# Patient Record
Sex: FEMALE | Race: WHITE | NOT HISPANIC OR LATINO
[De-identification: names, ages, dates, MRNs, and addresses within clinical notes are randomized per-mention and may not be internally consistent; named-entity substitution may affect disease eponyms.]

---

## 2018-08-15 ENCOUNTER — APPOINTMENT (OUTPATIENT)
Dept: FAMILY MEDICINE | Facility: CLINIC | Age: 28
End: 2018-08-15
Payer: COMMERCIAL

## 2018-08-15 VITALS
RESPIRATION RATE: 12 BRPM | OXYGEN SATURATION: 98 % | DIASTOLIC BLOOD PRESSURE: 72 MMHG | WEIGHT: 130 LBS | SYSTOLIC BLOOD PRESSURE: 118 MMHG | HEART RATE: 86 BPM | HEIGHT: 64 IN | BODY MASS INDEX: 22.2 KG/M2

## 2018-08-15 DIAGNOSIS — Z00.00 ENCOUNTER FOR GENERAL ADULT MEDICAL EXAMINATION W/OUT ABNORMAL FINDINGS: ICD-10-CM

## 2018-08-15 DIAGNOSIS — R53.83 OTHER FATIGUE: ICD-10-CM

## 2018-08-15 PROCEDURE — 99385 PREV VISIT NEW AGE 18-39: CPT

## 2018-08-15 NOTE — HISTORY OF PRESENT ILLNESS
[FreeTextEntry1] : 28-year-old white female new to this office, transferring care, here for a CPE. [de-identified] : No significant past medical or surgical history.\par Patient is a registered nurse working at [employer], ,  2 para 2, postpartum 6 months, breast-feeding, nonsmoker, social alcohol and no drugs.\par Patient complaining of fatigue, working full time and taking care of 2 children, one her toddler and the other an infant.\par She is on daily multivitamins.

## 2018-08-15 NOTE — ASSESSMENT
[FreeTextEntry1] : Physical exam normal\par Blood and urine to be collected at outside lab for complete physical profile.\par Fatigue----CBC, TSH, Lyme titer checked.\par Screening for cervical cancer--- patient is up to date with GYN appointments. Postpartum checkup was done 6 months ago.\par Patient is up to date with immunizations-\par \par Followup results one week.\par \par

## 2018-08-15 NOTE — HEALTH RISK ASSESSMENT
[Very Good] : ~his/her~  mood as very good [No falls in past year] : Patient reported no falls in the past year [0] : 2) Feeling down, depressed, or hopeless: Not at all (0) [Patient declined mammogram] : Patient declined mammogram [Patient reported PAP Smear was normal] : Patient reported PAP Smear was normal [Patient declined bone density test] : Patient declined bone density test [Patient declined colonoscopy] : Patient declined colonoscopy [HIV test declined] : HIV test declined [Hepatitis C test declined] : Hepatitis C test declined [None] : None [With Family] : lives with family [# of Members in Household ___] :  household currently consist of [unfilled] member(s) [Employed] : employed [College] : College [] :  [# Of Children ___] : has [unfilled] children [Sexually Active] : sexually active [Feels Safe at Home] : Feels safe at home [Fully functional (bathing, dressing, toileting, transferring, walking, feeding)] : Fully functional (bathing, dressing, toileting, transferring, walking, feeding) [Fully functional (using the telephone, shopping, preparing meals, housekeeping, doing laundry, using] : Fully functional and needs no help or supervision to perform IADLs (using the telephone, shopping, preparing meals, housekeeping, doing laundry, using transportation, managing medications and managing finances) [Smoke Detector] : smoke detector [Carbon Monoxide Detector] : carbon monoxide detector [Safety elements used in home] : safety elements used in home [Seat Belt] :  uses seat belt [Sunscreen] : uses sunscreen [Patient declined discussion] : Patient declined discussion [] : No [de-identified] : Derm [[id number]] : 0 [Change in mental status noted] : No change in mental status noted [Language] : denies difficulty with language [Behavior] : denies difficulty with behavior [Learning/Retaining New Information] : denies difficulty learning/retaining new information [Handling Complex Tasks] : denies difficulty handling complex tasks [Reasoning] : denies difficulty with reasoning [Spatial Ability and Orientation] : denies difficulty with spatial ability and orientation [High Risk Behavior] : no high risk behavior [Reports changes in hearing] : Reports no changes in hearing [Reports changes in vision] : Reports no changes in vision [Reports changes in dental health] : Reports no changes in dental health [Guns at Home] : no guns at home [TB Exposure] : is not being exposed to tuberculosis [Caregiver Concerns] : does not have caregiver concerns [PapSmearDate] : 07/17 [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

## 2018-08-15 NOTE — REVIEW OF SYSTEMS
[Fatigue] : fatigue [Negative] : Heme/Lymph [Fever] : no fever [Chills] : no chills [Discharge] : no discharge [Vision Problems] : no vision problems [Itching] : no itching [Earache] : no earache [Nasal Discharge] : no nasal discharge [Sore Throat] : no sore throat [Postnasal Drip] : no postnasal drip [Chest Pain] : no chest pain [Palpitations] : no palpitations [Lower Ext Edema] : no lower extremity edema [Orthopnea] : no orthopnea [Shortness Of Breath] : no shortness of breath [Wheezing] : no wheezing [Cough] : no cough [Abdominal Pain] : no abdominal pain [Nausea] : no nausea [Constipation] : no constipation [Diarrhea] : diarrhea [Dysuria] : no dysuria [Hematuria] : no hematuria [Frequency] : no frequency [Joint Pain] : no joint pain [Joint Stiffness] : no joint stiffness [Muscle Pain] : no muscle pain [Back Pain] : no back pain [Itching] : no itching [Skin Rash] : no skin rash [Headache] : no headache [Dizziness] : no dizziness [Fainting] : no fainting [Confusion] : no confusion [Suicidal] : not suicidal [Insomnia] : no insomnia [Anxiety] : no anxiety [Depression] : no depression [Easy Bleeding] : no easy bleeding [Easy Bruising] : no easy bruising [Swollen Glands] : no swollen glands

## 2018-09-06 ENCOUNTER — RESULT REVIEW (OUTPATIENT)
Age: 28
End: 2018-09-06

## 2018-11-06 LAB
HBA1C MFR BLD HPLC: 5.1
HBA1C MFR BLD: 5.1

## 2019-01-14 ENCOUNTER — APPOINTMENT (OUTPATIENT)
Dept: FAMILY MEDICINE | Facility: CLINIC | Age: 29
End: 2019-01-14
Payer: COMMERCIAL

## 2019-01-14 VITALS
WEIGHT: 128 LBS | SYSTOLIC BLOOD PRESSURE: 114 MMHG | BODY MASS INDEX: 21.85 KG/M2 | TEMPERATURE: 97.8 F | OXYGEN SATURATION: 99 % | DIASTOLIC BLOOD PRESSURE: 68 MMHG | HEART RATE: 99 BPM | HEIGHT: 64 IN | RESPIRATION RATE: 13 BRPM

## 2019-01-14 DIAGNOSIS — Z78.9 OTHER SPECIFIED HEALTH STATUS: ICD-10-CM

## 2019-01-14 DIAGNOSIS — Z80.3 FAMILY HISTORY OF MALIGNANT NEOPLASM OF BREAST: ICD-10-CM

## 2019-01-14 DIAGNOSIS — Z83.3 FAMILY HISTORY OF DIABETES MELLITUS: ICD-10-CM

## 2019-01-14 DIAGNOSIS — Z83.49 FAMILY HISTORY OF OTHER ENDOCRINE, NUTRITIONAL AND METABOLIC DISEASES: ICD-10-CM

## 2019-01-14 DIAGNOSIS — Z82.49 FAMILY HISTORY OF ISCHEMIC HEART DISEASE AND OTHER DISEASES OF THE CIRCULATORY SYSTEM: ICD-10-CM

## 2019-01-14 DIAGNOSIS — R22.2 LOCALIZED SWELLING, MASS AND LUMP, TRUNK: ICD-10-CM

## 2019-01-14 PROCEDURE — 99213 OFFICE O/P EST LOW 20 MIN: CPT

## 2024-09-12 ENCOUNTER — APPOINTMENT (OUTPATIENT)
Dept: DERMATOLOGY | Facility: CLINIC | Age: 34
End: 2024-09-12
Payer: COMMERCIAL

## 2024-09-12 PROCEDURE — 99203 OFFICE O/P NEW LOW 30 MIN: CPT

## 2024-10-02 ENCOUNTER — APPOINTMENT (OUTPATIENT)
Dept: UROGYNECOLOGY | Facility: CLINIC | Age: 34
End: 2024-10-02

## 2024-10-02 VITALS
WEIGHT: 130 LBS | SYSTOLIC BLOOD PRESSURE: 132 MMHG | BODY MASS INDEX: 22.2 KG/M2 | HEIGHT: 64 IN | DIASTOLIC BLOOD PRESSURE: 85 MMHG

## 2024-10-02 DIAGNOSIS — R32 UNSPECIFIED URINARY INCONTINENCE: ICD-10-CM

## 2024-10-02 LAB
BILIRUB UR QL STRIP: NEGATIVE
GLUCOSE UR-MCNC: NEGATIVE
HCG UR QL: 0.2 EU/DL
HGB UR QL STRIP.AUTO: NORMAL
KETONES UR-MCNC: NEGATIVE
LEUKOCYTE ESTERASE UR QL STRIP: NEGATIVE
NITRITE UR QL STRIP: NEGATIVE
PH UR STRIP: 7
PROT UR STRIP-MCNC: NEGATIVE
SP GR UR STRIP: 1.01

## 2024-10-02 PROCEDURE — 51701 INSERT BLADDER CATHETER: CPT | Mod: 59

## 2024-10-02 PROCEDURE — 81003 URINALYSIS AUTO W/O SCOPE: CPT | Mod: QW

## 2024-10-02 PROCEDURE — 99459 PELVIC EXAMINATION: CPT

## 2024-10-02 PROCEDURE — 99204 OFFICE O/P NEW MOD 45 MIN: CPT | Mod: 25

## 2024-10-02 RX ORDER — THIAMINE HCL 100 MG
TABLET ORAL
Refills: 0 | Status: ACTIVE | COMMUNITY

## 2024-10-02 RX ORDER — CHROMIUM 200 MCG
TABLET ORAL
Refills: 0 | Status: ACTIVE | COMMUNITY

## 2024-10-02 NOTE — PROCEDURE
[Straight Catheterization] : insertion of a straight catheter [Patient] : the patient [___ Fr Straight Tip] : a [unfilled] in [place] straight tip catheter [None] : there were no complications with the catheter insertion [Clear] : clear [No Complications] : no complications [Tolerated Well] : the patient tolerated the procedure well [Post procedure instructions and information given] : Post procedure instructions and information were given and reviewed with patient. [0] : 0 [Stress Incontinence] : stress incontinence

## 2024-10-02 NOTE — HISTORY OF PRESENT ILLNESS
[Vaginal Wall Prolapse] : no [Unable To Restrain Bowel Movement] : no [Urinary Frequency] : no [Feelings Of Urinary Urgency] : no [Urinary Frequency More Than Twice At Night (Nocturia)] : no nocturia [Urinary Tract Infection] : no [Constipation Obstructed Defecation] : no [Pelvic Pain] : no [Vaginal Pain] : no [Rectal Prolapse] : mild [] : years ago [FreeTextEntry1] : CHARLENE is a 34 year female who presents for urinary incontinence. Reports GARY with laughing, coughing, running. Leaking is most bothersome when she is working out. She is not wearing a pad but will cross her legs to avoid leaking. Denies gross hematuria, UTIs. She had bleeding after intercourse and told it is due to how her uterus was tilted. C/o bothersome bulge vaginally. She is done having children.      Daytime frequency: No Nocturia: No Urinary urgency: No Leakage of urine with urgency: No Leakage of urine with coughing sneezing laughing: Yes Incontinence pad use: No Sensation of incomplete bladder emptying: No History of frequent urinary tract infections: No History of hematuria: No Previous treatment: None Vaginal symptoms: Denies pelvic pain, Denies bulge Bowel symptoms: Denies constipation      OB: [birth year]  GYN: LMP 2024, Normal pap 2024, H/o abnormal pap , No estrogen use  PMH: None PSH: None Meds: Multivitamin, Vitamin D, Vitamin B complex Allx: NKDA

## 2024-10-02 NOTE — DISCUSSION/SUMMARY
[FreeTextEntry1] : CHARLENE is a 34 year female who presents for GARY and POP. On exam, negative CST, normal PVR, rectocele. Bulge is bothersome to her.   We discussed management options including observation, pelvic floor exercises with or without physical therapy, pessary, impressa, medications including imipramine and surgical management including urethral bulking agents, fascial sling, rosa and midurethral sling with mesh. IUGA information for PFE and GARY given to patient.   [] UA, Cx [] [facility] PT referral given   f/u 6 months All questions answered.

## 2024-10-02 NOTE — REASON FOR VISIT
[facility] ADULT HISTORY AND PHYSICAL      IDENTIFYING DATA  Patient is De Churchill a 80year old female.  MRN is [id number]    Admitting physician: Jayme Vásquez MD  Primary care physician: John Murcia MD    CHIEF COMPLAINT  Patient presen LUMBAR EPIDURAL;  Surgeon:                La Nena Talavera MD;  Location: [place] [address] MANAGEMENT  1/8/2013: Roslyn Denice NDL/CATH SPI DX/THER CVN      Comment: Procedure: CAUDAL;  Surgeon: La Nena Talavera MD;  [facility] Procedure: LUMBAR / TRANSFORAMINAL EPIDURAL                STEROID INJECTION;  Surgeon: Angel Kinney DO;  Location: [address] [place] [address]  5/20/2016: INJECTION, W/WO CONTRAST, DX/THERAPEUTIC SUBST* N/A      Comment: Procedure: LUMBAR MANAGEMENT  1/8/2013: PATIENT DOCUMENTED NOT TO HAVE EXPERIENCED ANY*      Comment: Procedure: CAUDAL;  Surgeon: Jeny Haley MD;  Location: [address] [place] [address] MANAGEMENT  10/17/2012: PATIENT [place] PREOPERATIVE ORDER FOR IV ANT* by mouth daily. Disp: 90 tablet Rfl: 2   Pyridoxine HCl (VITAMIN B-6, PYRIDOXINE,) 50 MG Oral Tab Take 1 tablet (50 mg total) by mouth daily.  Disp: 90 tablet Rfl: 2   Levothyroxine Sodium 25 MCG Oral Tab Take 1 tablet (25 mcg total) by mouth before breakfa excessive thirst, (-) feeling too hot/cold (-)  tired/sluggish  Gastrointestinal:(-)  abdominal pain   (-) nausea/vomiting (-) indigestion  Cardiovascular: (-) chest pain, (-) varicose veins (-) HTN  Integumentary: (-)  skin rash (-)  boils (-) persistent --    --   19   BILT   --    --   0.6   TP   --    --   5.4*       UA:  Recent Labs   Lab  03/01/18   1144   COLORUR  Yellow   CLARITY  Hazy*   SPECGRAVITY  1.011   GLUUR  Negative   BILUR  Negative   KETUR  Negative   BLOODURINE  Moderate*   PHURINE  5.0 [Initial Visit ___] : an initial visit for [unfilled] [Questionnaire Received] : Patient questionnaire received [Intake Form Reviewed] : Patient intake form with past medical history, surgical history, family history and social history reviewed today [Urinary Incontinence] : urinary incontinence

## 2024-10-02 NOTE — PHYSICAL EXAM
[Chaperone Present] : A chaperone was present in the examining room during all aspects of the physical examination [82824] : A chaperone was present during the pelvic exam. [No Acute Distress] : in no acute distress [Well developed] : well developed [Well Nourished] : ~L well nourished [Oriented x3] : oriented to person, place, and time [No Edema] : ~T edema was not present [Warm and Dry] : was warm and dry to touch [Vulvar Atrophy] : vulvar atrophy [Labia Majora] : were normal [Labia Minora] : were normal [Normal Appearance] : general appearance was normal [Atrophy] : atrophy [Soft] :  the cervix was soft [Uterine Adnexae] : were not tender and not enlarged [Normal] : no abnormalities [Post Void Residual ____ml] : post void residual was [unfilled] ml [Aa ____] : Aa [unfilled] [Ba ____] : Ba [unfilled] [C ____] : C [unfilled] [GH ____] : GH [unfilled] [PB ____] : PB [unfilled] [TVL ____] : TVL  [unfilled] [Ap ____] : Ap [unfilled] [Bp ____] : Bp [unfilled] [D ____] : D [unfilled] [FreeTextEntry2] :  Yakelin Thornton [Cough] : no cough [Tenderness] : ~T no ~M abdominal tenderness observed [Distended] : not distended [FreeTextEntry3] : neg CST

## 2024-10-02 NOTE — PROCEDURE
[Straight Catheterization] : insertion of a straight catheter [Patient] : the patient [___ Fr Straight Tip] : a [unfilled] in [place] straight tip catheter [None] : there were no complications with the catheter insertion [Clear] : clear [No Complications] : no complications [Tolerated Well] : the patient tolerated the procedure well [Post procedure instructions and information given] : Post procedure instructions and information were given and reviewed with patient. [0] : 0 [Stress Incontinence] : stress incontinence

## 2024-10-02 NOTE — PHYSICAL EXAM
[Chaperone Present] : A chaperone was present in the examining room during all aspects of the physical examination [92889] : A chaperone was present during the pelvic exam. [No Acute Distress] : in no acute distress [Well developed] : well developed [Well Nourished] : ~L well nourished [Oriented x3] : oriented to person, place, and time [No Edema] : ~T edema was not present [Warm and Dry] : was warm and dry to touch [Vulvar Atrophy] : vulvar atrophy [Labia Majora] : were normal [Labia Minora] : were normal [Normal Appearance] : general appearance was normal [Atrophy] : atrophy [Soft] :  the cervix was soft [Uterine Adnexae] : were not tender and not enlarged [Normal] : no abnormalities [Post Void Residual ____ml] : post void residual was [unfilled] ml [Aa ____] : Aa [unfilled] [Ba ____] : Ba [unfilled] [C ____] : C [unfilled] [GH ____] : GH [unfilled] [PB ____] : PB [unfilled] [TVL ____] : TVL  [unfilled] [Ap ____] : Ap [unfilled] [Bp ____] : Bp [unfilled] [D ____] : D [unfilled] [FreeTextEntry2] :  Yakelin Thornton [Cough] : no cough [Tenderness] : ~T no ~M abdominal tenderness observed [Distended] : not distended [FreeTextEntry3] : neg CST

## 2024-10-02 NOTE — OB HISTORY
[Vaginal ___] : [unfilled] vaginal delivery(s) [Definite ___ (Date)] : the last menstrual period was [unfilled] [Last Pap Smear ___] : date of last pap smear was on [unfilled] [Sexually Active] : sexually active [Abnormal Pap Smear] : normal pap smear [Taking Estrogens] : is not taking estrogen replacement [FreeTextEntry1] : Largest baby 9lbs 13oz

## 2024-10-02 NOTE — ADDENDUM
[FreeTextEntry1] : This note was written by Yakelin Thornton, acting as the  for Dr. Wagoner. This note accurately reflects the work and decisions made by Dr. Wagoner.

## 2024-10-03 LAB
APPEARANCE: CLEAR
BACTERIA: NEGATIVE /HPF
BILIRUBIN URINE: NEGATIVE
BLOOD URINE: NEGATIVE
CAST: 0 /LPF
COLOR: YELLOW
EPITHELIAL CELLS: 2 /HPF
GLUCOSE QUALITATIVE U: NEGATIVE MG/DL
KETONES URINE: NEGATIVE MG/DL
LEUKOCYTE ESTERASE URINE: NEGATIVE
MICROSCOPIC-UA: NORMAL
NITRITE URINE: NEGATIVE
PH URINE: 7.5
PROTEIN URINE: NEGATIVE MG/DL
RED BLOOD CELLS URINE: 0 /HPF
SPECIFIC GRAVITY URINE: 1.01
UROBILINOGEN URINE: 0.2 MG/DL
WHITE BLOOD CELLS URINE: 0 /HPF

## 2024-10-04 ENCOUNTER — NON-APPOINTMENT (OUTPATIENT)
Age: 34
End: 2024-10-04

## 2024-10-04 LAB — BACTERIA UR CULT: NORMAL

## 2024-11-05 ENCOUNTER — APPOINTMENT (OUTPATIENT)
Dept: UROGYNECOLOGY | Facility: CLINIC | Age: 34
End: 2024-11-05

## 2024-12-12 ENCOUNTER — RESULT REVIEW (OUTPATIENT)
Age: 34
End: 2024-12-12

## 2024-12-12 ENCOUNTER — APPOINTMENT (OUTPATIENT)
Dept: DERMATOLOGY | Facility: CLINIC | Age: 34
End: 2024-12-12
Payer: COMMERCIAL

## 2024-12-12 PROCEDURE — D0126: CPT

## 2025-02-18 ENCOUNTER — APPOINTMENT (OUTPATIENT)
Dept: ULTRASOUND IMAGING | Facility: CLINIC | Age: 35
End: 2025-02-18

## 2025-02-18 ENCOUNTER — OUTPATIENT (OUTPATIENT)
Dept: OUTPATIENT SERVICES | Facility: HOSPITAL | Age: 35
LOS: 1 days | End: 2025-02-18
Payer: COMMERCIAL

## 2025-02-18 ENCOUNTER — APPOINTMENT (OUTPATIENT)
Dept: MAMMOGRAPHY | Facility: CLINIC | Age: 35
End: 2025-02-18

## 2025-02-18 DIAGNOSIS — Z12.31 ENCOUNTER FOR SCREENING MAMMOGRAM FOR MALIGNANT NEOPLASM OF BREAST: ICD-10-CM

## 2025-02-18 PROCEDURE — 76642 ULTRASOUND BREAST LIMITED: CPT

## 2025-02-18 PROCEDURE — 76642 ULTRASOUND BREAST LIMITED: CPT | Mod: 26,50

## 2025-04-01 ENCOUNTER — APPOINTMENT (OUTPATIENT)
Dept: UROGYNECOLOGY | Facility: CLINIC | Age: 35
End: 2025-04-01

## 2025-05-02 ENCOUNTER — APPOINTMENT (OUTPATIENT)
Dept: ORTHOPEDIC SURGERY | Facility: CLINIC | Age: 35
End: 2025-05-02
Payer: COMMERCIAL

## 2025-05-02 VITALS — BODY MASS INDEX: 22.2 KG/M2 | WEIGHT: 130 LBS | HEIGHT: 64 IN

## 2025-05-02 DIAGNOSIS — M76.892 OTHER SPECIFIED ENTHESOPATHIES OF LEFT LOWER LIMB, EXCLUDING FOOT: ICD-10-CM

## 2025-05-02 PROCEDURE — 99203 OFFICE O/P NEW LOW 30 MIN: CPT

## 2025-05-02 PROCEDURE — 73564 X-RAY EXAM KNEE 4 OR MORE: CPT | Mod: LT

## 2025-05-16 ENCOUNTER — APPOINTMENT (OUTPATIENT)
Dept: ORTHOPEDIC SURGERY | Facility: CLINIC | Age: 35
End: 2025-05-16

## 2025-05-21 ENCOUNTER — APPOINTMENT (OUTPATIENT)
Dept: MRI IMAGING | Facility: CLINIC | Age: 35
End: 2025-05-21

## 2025-05-21 ENCOUNTER — RESULT REVIEW (OUTPATIENT)
Age: 35
End: 2025-05-21

## 2025-05-21 ENCOUNTER — OUTPATIENT (OUTPATIENT)
Dept: OUTPATIENT SERVICES | Facility: HOSPITAL | Age: 35
LOS: 1 days | End: 2025-05-21
Payer: COMMERCIAL

## 2025-05-21 DIAGNOSIS — M76.892 OTHER SPECIFIED ENTHESOPATHIES OF LEFT LOWER LIMB, EXCLUDING FOOT: ICD-10-CM

## 2025-05-21 PROCEDURE — 73721 MRI JNT OF LWR EXTRE W/O DYE: CPT | Mod: 26,LT

## 2025-06-26 ENCOUNTER — NON-APPOINTMENT (OUTPATIENT)
Age: 35
End: 2025-06-26

## 2025-06-27 PROBLEM — N32.81 OVERACTIVE BLADDER: Status: ACTIVE | Noted: 2025-06-27

## 2025-09-18 ENCOUNTER — APPOINTMENT (OUTPATIENT)
Dept: DERMATOLOGY | Facility: CLINIC | Age: 35
End: 2025-09-18